# Patient Record
Sex: FEMALE | Race: WHITE
[De-identification: names, ages, dates, MRNs, and addresses within clinical notes are randomized per-mention and may not be internally consistent; named-entity substitution may affect disease eponyms.]

---

## 2017-03-22 ENCOUNTER — HOSPITAL ENCOUNTER (OUTPATIENT)
Dept: HOSPITAL 53 - M ONCR | Age: 52
End: 2017-03-22
Attending: RADIOLOGY
Payer: COMMERCIAL

## 2017-03-22 DIAGNOSIS — C50.412: Primary | ICD-10-CM

## 2017-03-23 NOTE — RADONC
RADIATION ONCOLOGY FOLLOWUP NOTE:

 

DATE: 03/22/2017

 

CHART NO: [id number]

 

DIAGNOSIS: Left breast cancer.

 

STAGE:   Stage II B, [id number]

 

ECOG PERFORMANCE STATUS:  0

 

Ms. Gusman is a delightful 51-year-old white female with the diagnosis of a stage

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

of the left breast who is presenting to us today for routine followup visit 1

year and 7 months post completion of external beam radiation therapy.

 

The patient presents today reporting that she is doing quite well with no

complaints at this time related to her radiation therapy disease.  She has no

breast or bone pain.

 

REVIEW OF SYSTEMS:

The patient's review of systems is noncontributory.  She denies nausea, vomiting,

fevers, chills, night sweats, diplopia, headaches, anxiety or depression,

anorexia, weight loss, visual disturbances, chest pain, urinary or bowel

difficulties, bone pain, or neurological problems.

 

PHYSICAL EXAMINATION:

The patient is a well-developed, well-nourished female in no acute distress.

HEENT exam is normocephalic, atraumatic.  Extraocular movements are intact.

There is no palpable cervical, supraclavicular, infraclavicular, axillary, or

inguinal lymphadenopathy present.

Lungs are clear to auscultation and percussion.

Heart has a regular rate and rhythm.

Abdomen is benign with no hepatosplenomegaly, masses, or tenderness.

Breast examination reveals no masses or discharge bilaterally.

Skeletal examination reveals no tenderness to pressure or percussion of the bony

skeleton.

Extremities reveal no clubbing, cyanosis, or edema.

Neurologic exam is grossly intact, as is the remainder of the physical

examination.

 

ASSESSMENT:

The patient is clinically CHRISTINE at this time and will be seen by us again in 6

months for further followup.  She will also continue to be followed by her other

physicians as well.

 

cc:    *Thomas Barcenas MD

       *Dmitry Caro MD

       *Deniz Pina MD

       *Camron Chirinos DO

       *Liz Hughes MD

## 2018-05-22 ENCOUNTER — HOSPITAL ENCOUNTER (OUTPATIENT)
Dept: HOSPITAL 53 - M ONCR | Age: 53
End: 2018-05-22
Attending: RADIOLOGY
Payer: COMMERCIAL

## 2018-05-22 DIAGNOSIS — C50.912: Primary | ICD-10-CM

## 2018-10-01 ENCOUNTER — HOSPITAL ENCOUNTER (OUTPATIENT)
Dept: HOSPITAL 53 - M ONCR | Age: 53
End: 2018-10-01
Attending: RADIOLOGY
Payer: COMMERCIAL

## 2018-10-01 DIAGNOSIS — C50.412: Primary | ICD-10-CM

## 2019-03-27 ENCOUNTER — HOSPITAL ENCOUNTER (OUTPATIENT)
Dept: HOSPITAL 53 - M ONCR | Age: 54
End: 2019-03-27
Attending: RADIOLOGY
Payer: COMMERCIAL

## 2019-03-27 DIAGNOSIS — C50.412: Primary | ICD-10-CM

## 2019-03-27 DIAGNOSIS — I89.0: ICD-10-CM

## 2019-03-28 NOTE — RADONC
RADIATION ONCOLOGY FOLLOWUP NOTE

 

DATE:  03/27/2019

 

CHART NUMBER:  

 

DIAGNOSIS:  Left breast cancer.

 

STAGE:  Recurrent.

 

ECOG PERFORMANCE STATUS:  Zero.

 

FOLLOWUP NOTE:

Ms. Gusman is a very pleasant, 53-year-old white female with the initial

diagnosis of a stage II B, T2 [id number], poorly differentiated triple positive

invasive ductal carcinoma of the left breast, who is presenting to us today for

followup visit 3 years and 7 months post completion of external beam radiation

therapy.

 

The patient presents today reporting that she is doing quite well with no

complaints at this time related to her radiation therapy or disease.  She is

having no pain at this time.  She continues to have episodic lymph edema of her

left upper extremity, for which she has a sleeve she wears periodically.

 

The patient is continuing her systemic therapy with Hematology/Oncology

[facility] and is undergoing Herceptin at this time.

 

REVIEW OF SYSTEMS:

The patient's review of systems is noncontributory.  Denies nausea, vomiting,

fevers, chills, night sweats, diplopia, headaches, anxiety or depression,

anorexia, weight loss, visual disturbances, chest pain, urinary or bowel

difficulties, bone pain, or neurological problems.

 

PHYSICAL EXAMINATION:

The patient is a well-developed, well-nourished, 53-year-old white female, in no

acute distress.

HEENT exam is normocephalic, atraumatic.  Extraocular movements are intact.

There is no palpable cervical, supraclavicular, infraclavicular, axillary, or

inguinal lymphadenopathy present.

Lungs are clear to auscultation and percussion.

Heart has a regular rate and rhythm.

Abdomen is benign with no hepatosplenomegaly, masses, or tenderness.

Breast examination reveals no masses or discharge bilaterally.

Skeletal examination reveals no tenderness to pressure or percussion of the bony

skeleton.

Extremities reveal no clubbing, cyanosis, or edema.

Neurologic exam is grossly intact, as is the remainder of the physical

examination.

 

ASSESSMENT:

The patient is doing quite well at this time, is continuing her systemic therapy

at [facility] and is now undergoing Herceptin

treatments.

 

The patient is now presenting questioning me again on our opinion and what was

discussed a year ago with regards to any further treatment of the axilla.  I do

not remember offhand but my note documents that she was seen at [facility] as well as [facility] and at the [facility].  At that time, she was evaluating her advice from all of those

facilities as well as [facility] of [facility].

 

At that time, I thought radiation be highly risky and could cause damage and

further lymphedema, and perhaps neurological problems for her.  At that time, a

year ago now, she decided against radiation in conjunction with all of those

opinions.

 

Should the patient wish to readdress this issue, we can set her up for further

opinions at other institutions once again.  In the meantime, I have set her up

for routine followup in our office in 6 months' time.

 

 

 

 

 

cc:    MD Thomas Vega MD James E. Sartori, MD Ted J. Triana, DO Yi Zhang, MD